# Patient Record
Sex: FEMALE | Race: WHITE
[De-identification: names, ages, dates, MRNs, and addresses within clinical notes are randomized per-mention and may not be internally consistent; named-entity substitution may affect disease eponyms.]

---

## 2017-02-13 ENCOUNTER — HOSPITAL ENCOUNTER (OUTPATIENT)
Dept: HOSPITAL 45 - C.LABMFLN | Age: 67
Discharge: HOME | End: 2017-02-13
Attending: FAMILY MEDICINE
Payer: COMMERCIAL

## 2017-02-13 DIAGNOSIS — Z86.711: Primary | ICD-10-CM

## 2017-02-13 LAB
INR PPP: 1.3 (ref 0.9–1.1)
PROTHROMBIN TIME: 14 SECONDS (ref 9–12)

## 2017-02-17 ENCOUNTER — HOSPITAL ENCOUNTER (OUTPATIENT)
Dept: HOSPITAL 45 - C.LABMFLN | Age: 67
Discharge: HOME | End: 2017-02-17
Attending: FAMILY MEDICINE
Payer: COMMERCIAL

## 2017-02-17 DIAGNOSIS — R30.0: Primary | ICD-10-CM

## 2017-03-07 ENCOUNTER — HOSPITAL ENCOUNTER (OUTPATIENT)
Dept: HOSPITAL 45 - C.LABMFLN | Age: 67
Discharge: HOME | End: 2017-03-07
Attending: FAMILY MEDICINE
Payer: COMMERCIAL

## 2017-03-07 DIAGNOSIS — Z86.711: Primary | ICD-10-CM

## 2017-03-07 LAB
INR PPP: 2.3 (ref 0.9–1.1)
PROTHROMBIN TIME: 26 SECONDS (ref 9–12)

## 2017-06-01 ENCOUNTER — HOSPITAL ENCOUNTER (OUTPATIENT)
Dept: HOSPITAL 45 - C.LABMFLN | Age: 67
Discharge: HOME | End: 2017-06-01
Attending: FAMILY MEDICINE
Payer: COMMERCIAL

## 2017-06-01 DIAGNOSIS — I10: Primary | ICD-10-CM

## 2017-06-01 DIAGNOSIS — Z12.31: ICD-10-CM

## 2017-06-01 DIAGNOSIS — M25.551: ICD-10-CM

## 2017-06-01 LAB
ANION GAP SERPL CALC-SCNC: 7 MMOL/L (ref 3–11)
BUN SERPL-MCNC: 18 MG/DL (ref 7–18)
BUN/CREAT SERPL: 20 (ref 10–20)
CALCIUM SERPL-MCNC: 9.2 MG/DL (ref 8.5–10.1)
CHLORIDE SERPL-SCNC: 102 MMOL/L (ref 98–107)
CO2 SERPL-SCNC: 31 MMOL/L (ref 21–32)
CREAT SERPL-MCNC: 0.9 MG/DL (ref 0.6–1.2)
CRP SERPL-MCNC: 2.14 MG/DL (ref 0–0.29)
EOSINOPHIL NFR BLD AUTO: 252 K/UL (ref 130–400)
GLUCOSE SERPL-MCNC: 85 MG/DL (ref 70–99)
HCT VFR BLD CALC: 49.8 % (ref 37–47)
MCH RBC QN AUTO: 28.4 PG (ref 25–34)
MCHC RBC AUTO-ENTMCNC: 33.1 G/DL (ref 32–36)
MCV RBC AUTO: 85.6 FL (ref 80–100)
PMV BLD AUTO: 11.4 FL (ref 7.4–10.4)
POTASSIUM SERPL-SCNC: 3.9 MMOL/L (ref 3.5–5.1)
RBC # BLD AUTO: 5.82 M/UL (ref 4.2–5.4)
RHEUMATOID FACT FLD-ACNC: < 10 U/ML (ref 0–15)
SODIUM SERPL-SCNC: 140 MMOL/L (ref 136–145)
WBC # BLD AUTO: 5.49 K/UL (ref 4.8–10.8)

## 2017-06-06 LAB — ANA TITR SER: (no result) TITER

## 2017-06-13 ENCOUNTER — HOSPITAL ENCOUNTER (OUTPATIENT)
Dept: HOSPITAL 45 - C.PAPS | Age: 67
Discharge: HOME | End: 2017-06-13
Attending: PHYSICIAN ASSISTANT
Payer: COMMERCIAL

## 2017-06-13 DIAGNOSIS — Z01.419: Primary | ICD-10-CM

## 2017-06-26 ENCOUNTER — HOSPITAL ENCOUNTER (OUTPATIENT)
Dept: HOSPITAL 45 - C.RAD1850 | Age: 67
Discharge: HOME | End: 2017-06-26
Attending: INTERNAL MEDICINE
Payer: COMMERCIAL

## 2017-06-26 DIAGNOSIS — M25.579: Primary | ICD-10-CM

## 2017-06-26 DIAGNOSIS — M35.3: ICD-10-CM

## 2017-06-26 DIAGNOSIS — R79.82: ICD-10-CM

## 2017-06-26 DIAGNOSIS — R76.8: ICD-10-CM

## 2017-06-26 DIAGNOSIS — M79.641: ICD-10-CM

## 2017-06-26 DIAGNOSIS — M79.642: ICD-10-CM

## 2017-06-26 DIAGNOSIS — E55.9: ICD-10-CM

## 2017-06-26 LAB
APPEARANCE UR: CLEAR
BILIRUB UR-MCNC: (no result) MG/DL
COLOR UR: YELLOW
MANUAL MICROSCOPIC REQUIRED?: NO
NITRITE UR QL STRIP: (no result)
PH UR STRIP: 7 [PH] (ref 4.5–7.5)
REVIEW REQ?: NO
SP GR UR STRIP: 1.02 (ref 1–1.03)
URINE EPITHELIAL CELL AUTO: (no result) /LPF (ref 0–5)
UROBILINOGEN UR-MCNC: (no result) MG/DL

## 2017-06-26 NOTE — DIAGNOSTIC IMAGING REPORT
LEFT ANKLE MIN 3 VIEWS ROUTINE, RIGHT ANKLE MIN 3 VIEWS ROUTINE



CLINICAL HISTORY: M35.3 Polymyalgia [id number] Positive RUTH (antinuclear

anti. Bilateral ankle pain.



COMPARISON STUDY:  None.



FINDINGS: No acute fracture or dislocation within the right or left ankle. No

erosions identified. Small left and large right plantar heel spur. There is

calcification within the right plantar fascia and distal Achilles tendon. Mild

bilateral soft tissue swelling. Cartilage spaces within the tibiotalar joints

are maintained. Bone mineralization is intact.



IMPRESSION:  

1. No erosions or significant degenerative changes within the bilateral

tibiotalar joints.

2. Mild bilateral soft tissue swelling.

3. Small left and large right plantar heel spurs.









Electronically signed by:  Tarik Verdin M.D.

6/26/2017 5:11 PM



Dictated Date/Time:  6/26/2017 5:09 PM

## 2017-06-26 NOTE — DIAGNOSTIC IMAGING REPORT
RIGHT HAND MIN 3 VIEWS ROUTINE, LEFT HAND MIN 3 VIEWS ROUTINE



CLINICAL HISTORY: M35.3 Polymyalgia [id number] Positive RUTH (antinuclear

anti

Right   



COMPARISON STUDY:  None.



FINDINGS: No fracture or dislocation within the right or left hand. Bilateral

scapholunate dissociation. No erosions identified. Mild osteoarthritis within

the DIP and PIP joints. There is mild right and moderate left radiocarpal

osteoarthritis. No significant soft tissue swelling. Bone mineralization remains

intact for age. Mild chondrocalcinosis within the bilateral wrists.



IMPRESSION:  

1. 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 Moderate

osteoarthritis within the left wrist.

2. Chondrocalcinosis within the bilateral wrists. 









Electronically signed by:  Tarik Verdin M.D.

6/26/2017 5:09 PM



Dictated Date/Time:  6/26/2017 5:07 PM

## 2017-06-26 NOTE — DIAGNOSTIC IMAGING REPORT
LEFT ANKLE MIN 3 VIEWS ROUTINE, RIGHT ANKLE MIN 3 VIEWS ROUTINE



CLINICAL HISTORY: M35.3 Polymyalgia [id number] Positive RUTH (antinuclear

anti. Bilateral ankle pain.



COMPARISON STUDY:  None.



FINDINGS: No acute fracture or dislocation within the right or left ankle. No

erosions identified. Small left and large right plantar heel spur. There is

calcification within the right plantar fascia and distal Achilles tendon. Mild

bilateral soft tissue swelling. Cartilage spaces within the tibiotalar joints

are maintained. Bone mineralization is intact.



IMPRESSION:  

1. No erosions or significant degenerative changes within the bilateral

tibiotalar joints.

2. Mild bilateral soft tissue swelling.

3. Small left and large right plantar heel spurs.









Electronically signed by:  Tarik Verdin M.D.

6/26/2017 5:11 PM



Dictated Date/Time:  6/26/2017 5:09 PM

## 2017-06-26 NOTE — DIAGNOSTIC IMAGING REPORT
RIGHT HAND MIN 3 VIEWS ROUTINE, LEFT HAND MIN 3 VIEWS ROUTINE



CLINICAL HISTORY: M35.3 Polymyalgia [id number] Positive RUTH (antinuclear

anti

Right   



COMPARISON STUDY:  None.



FINDINGS: No fracture or dislocation within the right or left hand. Bilateral

scapholunate dissociation. No erosions identified. Mild osteoarthritis within

the DIP and PIP joints. There is mild right and moderate left radiocarpal

osteoarthritis. No significant soft tissue swelling. Bone mineralization remains

intact for age. Mild chondrocalcinosis within the bilateral wrists.



IMPRESSION:  

1. 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 Moderate

osteoarthritis within the left wrist.

2. Chondrocalcinosis within the bilateral wrists. 









Electronically signed by:  Tarik Verdin M.D.

6/26/2017 5:09 PM



Dictated Date/Time:  6/26/2017 5:07 PM

## 2017-07-03 LAB
ANTI-CENTROMERE AB: (no result) AI
DNA DS CRITHIDIA: NEGATIVE
ENA SM AB SER-ACNC: (no result) AI
ENA SS-A IGG SER QL IA: (no result) AI
ENA SS-B AB SER-ACNC: (no result) AI

## 2017-07-05 LAB — ANA TITR SER: (no result) TITER

## 2017-07-10 ENCOUNTER — HOSPITAL ENCOUNTER (OUTPATIENT)
Dept: HOSPITAL 45 - C.RAD1850 | Age: 67
End: 2017-07-10
Attending: INTERNAL MEDICINE
Payer: COMMERCIAL

## 2017-07-10 DIAGNOSIS — R79.82: ICD-10-CM

## 2017-07-10 DIAGNOSIS — M85.811: ICD-10-CM

## 2017-07-10 DIAGNOSIS — R76.8: ICD-10-CM

## 2017-07-10 DIAGNOSIS — M35.3: ICD-10-CM

## 2017-07-10 DIAGNOSIS — M54.2: Primary | ICD-10-CM

## 2017-07-10 LAB
CRP SERPL-MCNC: 0.97 MG/DL (ref 0–0.29)
TIBC SERPL-MCNC: 282 MCG/DL (ref 250–450)

## 2017-07-10 NOTE — DIAGNOSTIC IMAGING REPORT
C-SPINE ROUTINE 4 OR 5 VIEWS



CLINICAL HISTORY: M35.3 Polymyalgia [id number] Positive RUTH (antinuclear

anti neck pain



COMPARISON STUDY:  No previous studies for comparison.



FINDINGS: The prevertebral soft tissues are normal. No fractures or subluxations

are visualized. There are degenerative changes most pronounced the C5-6 level.

There is minimal left-sided bony foraminal encroachment at the C5-6 level. Mild

degenerative changes are also present the C1-2 level.



IMPRESSION:  

1. No acute fractures or subluxations identified

2. No evidence of erosive disease

3. Degenerative changes most pronounced the C1-2 and C5-6 levels. 









Electronically signed by:  Kyrie Rothman M.D.

7/10/2017 5:13 PM



Dictated Date/Time:  7/10/2017 5:12 PM

## 2017-07-10 NOTE — DIAGNOSTIC IMAGING REPORT
RIGHT SHOULDER 3 VIEWS



CLINICAL HISTORY: Polymyalgia rheumatica. Positive RUTH.



FINDINGS: 3 views of the right shoulder are obtained. No prior studies are

available for comparison at the time of dictation. The skeletal structures are

osteopenic. No fracture or dislocation is seen. Productive degenerative change

is identified at the acromioclavicular joint. The glenohumeral articulation is

preserved. No erosive change is seen. The overlying soft tissues are within

normal limits. The visualized right lung parenchyma appears clear. Degenerative

change is noted in the partially imaged thoracic spine.



IMPRESSION: Osteopenia and degenerative change as above. No acute bony

abnormality is seen.







Electronically signed by:  Sundar Castelan M.D.

7/10/2017 5:10 PM



Dictated Date/Time:  7/10/2017 5:09 PM

## 2017-07-10 NOTE — DIAGNOSTIC IMAGING REPORT
LEFT SHOULDER MIN 2 VIEWS ROUTINE



CLINICAL HISTORY: M35.3 Polymyalgia [id number] Positive RUTH (antinuclear

anti  LEFT SHOULDER PAIN



COMPARISON: None.



DISCUSSION: There are mild degenerative changes within the AC joint. There are

no acute fractures. There are no erosive or destructive changes.    



IMPRESSION: No fractures or dislocations identified. No evidence of erosive

disease.







Electronically signed by:  Kyrie Rothman M.D.

7/10/2017 5:11 PM



Dictated Date/Time:  7/10/2017 5:11 PM

## 2018-03-02 ENCOUNTER — HOSPITAL ENCOUNTER (OUTPATIENT)
Dept: HOSPITAL 45 - C.LABMFLN | Age: 68
Discharge: HOME | End: 2018-03-02
Attending: FAMILY MEDICINE
Payer: COMMERCIAL

## 2018-03-02 DIAGNOSIS — E78.5: Primary | ICD-10-CM

## 2018-03-02 DIAGNOSIS — R53.83: ICD-10-CM

## 2018-03-02 LAB
ALT SERPL-CCNC: 16 U/L (ref 12–78)
BUN SERPL-MCNC: 20 MG/DL (ref 7–18)
CALCIUM SERPL-MCNC: 8.7 MG/DL (ref 8.5–10.1)
CO2 SERPL-SCNC: 25 MMOL/L (ref 21–32)
CREAT SERPL-MCNC: 0.84 MG/DL (ref 0.6–1.2)
GLUCOSE SERPL-MCNC: 101 MG/DL (ref 70–99)
LDL CHOLESTEROL (DIRECT): 80 MG/DL
PH UR: 142 MG/DL (ref 0–200)
POTASSIUM SERPL-SCNC: 4.2 MMOL/L (ref 3.5–5.1)
SODIUM SERPL-SCNC: 138 MMOL/L (ref 136–145)

## 2018-05-15 ENCOUNTER — HOSPITAL ENCOUNTER (OUTPATIENT)
Dept: HOSPITAL 45 - C.LABMFLN | Age: 68
Discharge: HOME | End: 2018-05-15
Attending: FAMILY MEDICINE
Payer: COMMERCIAL

## 2018-05-15 DIAGNOSIS — R48.1: ICD-10-CM

## 2018-05-15 DIAGNOSIS — E53.8: Primary | ICD-10-CM
